# Patient Record
Sex: FEMALE | Race: ASIAN | NOT HISPANIC OR LATINO | URBAN - METROPOLITAN AREA
[De-identification: names, ages, dates, MRNs, and addresses within clinical notes are randomized per-mention and may not be internally consistent; named-entity substitution may affect disease eponyms.]

---

## 2017-08-25 PROBLEM — Z00.00 ENCOUNTER FOR PREVENTIVE HEALTH EXAMINATION: Status: ACTIVE | Noted: 2017-08-25

## 2017-09-12 ENCOUNTER — OUTPATIENT (OUTPATIENT)
Dept: OUTPATIENT SERVICES | Age: 32
LOS: 1 days | Discharge: HOME | End: 2017-09-12

## 2017-09-13 ENCOUNTER — APPOINTMENT (OUTPATIENT)
Dept: PEDIATRIC CARDIOLOGY | Facility: CLINIC | Age: 32
End: 2017-09-13
Payer: MEDICAID

## 2017-09-13 PROCEDURE — 76820 UMBILICAL ARTERY ECHO: CPT

## 2017-09-13 PROCEDURE — 93325 DOPPLER ECHO COLOR FLOW MAPG: CPT | Mod: 59

## 2017-09-13 PROCEDURE — 76827 ECHO EXAM OF FETAL HEART: CPT

## 2017-09-13 PROCEDURE — 99201 OFFICE OUTPATIENT NEW 10 MINUTES: CPT | Mod: 25

## 2017-09-13 PROCEDURE — 76825 ECHO EXAM OF FETAL HEART: CPT

## 2018-01-13 ENCOUNTER — INPATIENT (INPATIENT)
Facility: HOSPITAL | Age: 33
LOS: 2 days | Discharge: HOME | End: 2018-01-16
Attending: OBSTETRICS & GYNECOLOGY | Admitting: OBSTETRICS & GYNECOLOGY
Payer: MEDICAID

## 2018-01-13 VITALS — WEIGHT: 138.89 LBS | HEIGHT: 68 IN

## 2018-01-13 DIAGNOSIS — O26.899 OTHER SPECIFIED PREGNANCY RELATED CONDITIONS, UNSPECIFIED TRIMESTER: ICD-10-CM

## 2018-01-13 DIAGNOSIS — Z3A.00 WEEKS OF GESTATION OF PREGNANCY NOT SPECIFIED: ICD-10-CM

## 2018-01-13 DIAGNOSIS — Z34.80 ENCOUNTER FOR SUPERVISION OF OTHER NORMAL PREGNANCY, UNSPECIFIED TRIMESTER: ICD-10-CM

## 2018-01-13 LAB
BASOPHILS # BLD AUTO: 0 K/UL — SIGNIFICANT CHANGE UP (ref 0–0.2)
BASOPHILS NFR BLD AUTO: 0.3 % — SIGNIFICANT CHANGE UP (ref 0–2)
BLD GP AB SCN SERPL QL: NEGATIVE — SIGNIFICANT CHANGE UP
EOSINOPHIL # BLD AUTO: 0.1 K/UL — SIGNIFICANT CHANGE UP (ref 0–0.5)
EOSINOPHIL NFR BLD AUTO: 1 % — SIGNIFICANT CHANGE UP (ref 0–6)
HCT VFR BLD CALC: 33.7 % — LOW (ref 34.5–45)
HGB BLD-MCNC: 11.6 G/DL — SIGNIFICANT CHANGE UP (ref 11.5–15.5)
LYMPHOCYTES # BLD AUTO: 1.5 K/UL — SIGNIFICANT CHANGE UP (ref 1–3.3)
LYMPHOCYTES # BLD AUTO: 14.8 % — SIGNIFICANT CHANGE UP (ref 13–44)
MCHC RBC-ENTMCNC: 31.6 PG — SIGNIFICANT CHANGE UP (ref 27–34)
MCHC RBC-ENTMCNC: 34.4 GM/DL — SIGNIFICANT CHANGE UP (ref 32–36)
MCV RBC AUTO: 91.9 FL — SIGNIFICANT CHANGE UP (ref 80–100)
MONOCYTES # BLD AUTO: 0.8 K/UL — SIGNIFICANT CHANGE UP (ref 0–0.9)
MONOCYTES NFR BLD AUTO: 7.8 % — SIGNIFICANT CHANGE UP (ref 2–14)
NEUTROPHILS # BLD AUTO: 7.8 K/UL — HIGH (ref 1.8–7.4)
NEUTROPHILS NFR BLD AUTO: 76.1 % — SIGNIFICANT CHANGE UP (ref 43–77)
PLATELET # BLD AUTO: 162 K/UL — SIGNIFICANT CHANGE UP (ref 150–400)
RBC # BLD: 3.67 M/UL — LOW (ref 3.8–5.2)
RBC # FLD: 12.3 % — SIGNIFICANT CHANGE UP (ref 10.3–14.5)
RH IG SCN BLD-IMP: POSITIVE — SIGNIFICANT CHANGE UP
RH IG SCN BLD-IMP: POSITIVE — SIGNIFICANT CHANGE UP
WBC # BLD: 10.2 K/UL — SIGNIFICANT CHANGE UP (ref 3.8–10.5)
WBC # FLD AUTO: 10.2 K/UL — SIGNIFICANT CHANGE UP (ref 3.8–10.5)

## 2018-01-13 RX ORDER — AMPICILLIN TRIHYDRATE 250 MG
CAPSULE ORAL
Qty: 0 | Refills: 0 | Status: DISCONTINUED | OUTPATIENT
Start: 2018-01-13 | End: 2018-01-14

## 2018-01-13 RX ORDER — AMPICILLIN TRIHYDRATE 250 MG
1 CAPSULE ORAL EVERY 4 HOURS
Qty: 0 | Refills: 0 | Status: DISCONTINUED | OUTPATIENT
Start: 2018-01-13 | End: 2018-01-14

## 2018-01-13 RX ORDER — OXYTOCIN 10 UNIT/ML
4 VIAL (ML) INJECTION
Qty: 30 | Refills: 0 | Status: DISCONTINUED | OUTPATIENT
Start: 2018-01-13 | End: 2018-01-16

## 2018-01-13 RX ORDER — CITRIC ACID/SODIUM CITRATE 300-500 MG
15 SOLUTION, ORAL ORAL EVERY 4 HOURS
Qty: 0 | Refills: 0 | Status: DISCONTINUED | OUTPATIENT
Start: 2018-01-13 | End: 2018-01-14

## 2018-01-13 RX ORDER — AMPICILLIN TRIHYDRATE 250 MG
2 CAPSULE ORAL ONCE
Qty: 0 | Refills: 0 | Status: COMPLETED | OUTPATIENT
Start: 2018-01-13 | End: 2018-01-13

## 2018-01-13 RX ORDER — SODIUM CHLORIDE 9 MG/ML
1000 INJECTION, SOLUTION INTRAVENOUS ONCE
Qty: 0 | Refills: 0 | Status: COMPLETED | OUTPATIENT
Start: 2018-01-13 | End: 2018-01-13

## 2018-01-13 RX ORDER — SODIUM CHLORIDE 9 MG/ML
1000 INJECTION, SOLUTION INTRAVENOUS
Qty: 0 | Refills: 0 | Status: DISCONTINUED | OUTPATIENT
Start: 2018-01-13 | End: 2018-01-14

## 2018-01-13 RX ORDER — OXYTOCIN 10 UNIT/ML
333.33 VIAL (ML) INJECTION
Qty: 20 | Refills: 0 | Status: COMPLETED | OUTPATIENT
Start: 2018-01-13

## 2018-01-13 RX ADMIN — Medication 216 GRAM(S): at 19:47

## 2018-01-13 RX ADMIN — SODIUM CHLORIDE 125 MILLILITER(S): 9 INJECTION, SOLUTION INTRAVENOUS at 20:29

## 2018-01-13 RX ADMIN — Medication 208 GRAM(S): at 23:48

## 2018-01-13 RX ADMIN — Medication 4 MILLIUNIT(S)/MIN: at 23:43

## 2018-01-13 RX ADMIN — SODIUM CHLORIDE 2000 MILLILITER(S): 9 INJECTION, SOLUTION INTRAVENOUS at 19:30

## 2018-01-14 LAB
HBV SURFACE AG SERPL QL IA: SIGNIFICANT CHANGE UP
HIV 1 & 2 AB SERPL IA.RAPID: SIGNIFICANT CHANGE UP
RUBV IGG SER-ACNC: 2.2 INDEX — SIGNIFICANT CHANGE UP
RUBV IGG SER-IMP: POSITIVE — SIGNIFICANT CHANGE UP
T PALLIDUM AB TITR SER: NEGATIVE — SIGNIFICANT CHANGE UP

## 2018-01-14 RX ORDER — OXYCODONE HYDROCHLORIDE 5 MG/1
5 TABLET ORAL
Qty: 0 | Refills: 0 | Status: DISCONTINUED | OUTPATIENT
Start: 2018-01-14 | End: 2018-01-16

## 2018-01-14 RX ORDER — OXYCODONE HYDROCHLORIDE 5 MG/1
5 TABLET ORAL EVERY 4 HOURS
Qty: 0 | Refills: 0 | Status: DISCONTINUED | OUTPATIENT
Start: 2018-01-14 | End: 2018-01-16

## 2018-01-14 RX ORDER — ACETAMINOPHEN 500 MG
975 TABLET ORAL EVERY 6 HOURS
Qty: 0 | Refills: 0 | Status: COMPLETED | OUTPATIENT
Start: 2018-01-14 | End: 2018-12-13

## 2018-01-14 RX ORDER — OXYTOCIN 10 UNIT/ML
41.67 VIAL (ML) INJECTION
Qty: 20 | Refills: 0 | Status: DISCONTINUED | OUTPATIENT
Start: 2018-01-14 | End: 2018-01-16

## 2018-01-14 RX ORDER — SODIUM CHLORIDE 9 MG/ML
3 INJECTION INTRAMUSCULAR; INTRAVENOUS; SUBCUTANEOUS EVERY 8 HOURS
Qty: 0 | Refills: 0 | Status: DISCONTINUED | OUTPATIENT
Start: 2018-01-14 | End: 2018-01-16

## 2018-01-14 RX ORDER — OXYTOCIN 10 UNIT/ML
41.67 VIAL (ML) INJECTION
Qty: 20 | Refills: 0 | Status: DISCONTINUED | OUTPATIENT
Start: 2018-01-14 | End: 2018-01-14

## 2018-01-14 RX ORDER — HYDROCORTISONE 1 %
1 OINTMENT (GRAM) TOPICAL EVERY 4 HOURS
Qty: 0 | Refills: 0 | Status: DISCONTINUED | OUTPATIENT
Start: 2018-01-14 | End: 2018-01-16

## 2018-01-14 RX ORDER — IBUPROFEN 200 MG
600 TABLET ORAL EVERY 6 HOURS
Qty: 0 | Refills: 0 | Status: DISCONTINUED | OUTPATIENT
Start: 2018-01-14 | End: 2018-01-16

## 2018-01-14 RX ORDER — AER TRAVELER 0.5 G/1
1 SOLUTION RECTAL; TOPICAL EVERY 4 HOURS
Qty: 0 | Refills: 0 | Status: DISCONTINUED | OUTPATIENT
Start: 2018-01-14 | End: 2018-01-16

## 2018-01-14 RX ORDER — GLYCERIN ADULT
1 SUPPOSITORY, RECTAL RECTAL AT BEDTIME
Qty: 0 | Refills: 0 | Status: DISCONTINUED | OUTPATIENT
Start: 2018-01-14 | End: 2018-01-16

## 2018-01-14 RX ORDER — DIBUCAINE 1 %
1 OINTMENT (GRAM) RECTAL EVERY 4 HOURS
Qty: 0 | Refills: 0 | Status: DISCONTINUED | OUTPATIENT
Start: 2018-01-14 | End: 2018-01-14

## 2018-01-14 RX ORDER — DIPHENHYDRAMINE HCL 50 MG
25 CAPSULE ORAL EVERY 6 HOURS
Qty: 0 | Refills: 0 | Status: DISCONTINUED | OUTPATIENT
Start: 2018-01-14 | End: 2018-01-16

## 2018-01-14 RX ORDER — TETANUS TOXOID, REDUCED DIPHTHERIA TOXOID AND ACELLULAR PERTUSSIS VACCINE, ADSORBED 5; 2.5; 8; 8; 2.5 [IU]/.5ML; [IU]/.5ML; UG/.5ML; UG/.5ML; UG/.5ML
0.5 SUSPENSION INTRAMUSCULAR ONCE
Qty: 0 | Refills: 0 | Status: DISCONTINUED | OUTPATIENT
Start: 2018-01-14 | End: 2018-01-16

## 2018-01-14 RX ORDER — MAGNESIUM HYDROXIDE 400 MG/1
30 TABLET, CHEWABLE ORAL
Qty: 0 | Refills: 0 | Status: DISCONTINUED | OUTPATIENT
Start: 2018-01-14 | End: 2018-01-16

## 2018-01-14 RX ORDER — HYDROCORTISONE 1 %
1 OINTMENT (GRAM) TOPICAL EVERY 4 HOURS
Qty: 0 | Refills: 0 | Status: DISCONTINUED | OUTPATIENT
Start: 2018-01-14 | End: 2018-01-14

## 2018-01-14 RX ORDER — SODIUM CHLORIDE 9 MG/ML
3 INJECTION INTRAMUSCULAR; INTRAVENOUS; SUBCUTANEOUS EVERY 8 HOURS
Qty: 0 | Refills: 0 | Status: DISCONTINUED | OUTPATIENT
Start: 2018-01-14 | End: 2018-01-14

## 2018-01-14 RX ORDER — SIMETHICONE 80 MG/1
80 TABLET, CHEWABLE ORAL EVERY 6 HOURS
Qty: 0 | Refills: 0 | Status: DISCONTINUED | OUTPATIENT
Start: 2018-01-14 | End: 2018-01-16

## 2018-01-14 RX ORDER — IBUPROFEN 200 MG
600 TABLET ORAL EVERY 6 HOURS
Qty: 0 | Refills: 0 | Status: COMPLETED | OUTPATIENT
Start: 2018-01-14 | End: 2018-12-13

## 2018-01-14 RX ORDER — PRAMOXINE HYDROCHLORIDE 150 MG/15G
1 AEROSOL, FOAM RECTAL EVERY 4 HOURS
Qty: 0 | Refills: 0 | Status: DISCONTINUED | OUTPATIENT
Start: 2018-01-14 | End: 2018-01-14

## 2018-01-14 RX ORDER — AER TRAVELER 0.5 G/1
1 SOLUTION RECTAL; TOPICAL EVERY 4 HOURS
Qty: 0 | Refills: 0 | Status: DISCONTINUED | OUTPATIENT
Start: 2018-01-14 | End: 2018-01-14

## 2018-01-14 RX ORDER — ACETAMINOPHEN 500 MG
975 TABLET ORAL EVERY 6 HOURS
Qty: 0 | Refills: 0 | Status: DISCONTINUED | OUTPATIENT
Start: 2018-01-14 | End: 2018-01-16

## 2018-01-14 RX ORDER — DIBUCAINE 1 %
1 OINTMENT (GRAM) RECTAL EVERY 4 HOURS
Qty: 0 | Refills: 0 | Status: DISCONTINUED | OUTPATIENT
Start: 2018-01-14 | End: 2018-01-16

## 2018-01-14 RX ORDER — OXYTOCIN 10 UNIT/ML
333.33 VIAL (ML) INJECTION
Qty: 20 | Refills: 0 | Status: COMPLETED | OUTPATIENT
Start: 2018-01-14 | End: 2018-01-14

## 2018-01-14 RX ORDER — DOCUSATE SODIUM 100 MG
100 CAPSULE ORAL
Qty: 0 | Refills: 0 | Status: DISCONTINUED | OUTPATIENT
Start: 2018-01-14 | End: 2018-01-15

## 2018-01-14 RX ORDER — KETOROLAC TROMETHAMINE 30 MG/ML
30 SYRINGE (ML) INJECTION ONCE
Qty: 0 | Refills: 0 | Status: DISCONTINUED | OUTPATIENT
Start: 2018-01-14 | End: 2018-01-14

## 2018-01-14 RX ORDER — PRAMOXINE HYDROCHLORIDE 150 MG/15G
1 AEROSOL, FOAM RECTAL EVERY 4 HOURS
Qty: 0 | Refills: 0 | Status: DISCONTINUED | OUTPATIENT
Start: 2018-01-14 | End: 2018-01-16

## 2018-01-14 RX ORDER — LANOLIN
1 OINTMENT (GRAM) TOPICAL EVERY 6 HOURS
Qty: 0 | Refills: 0 | Status: DISCONTINUED | OUTPATIENT
Start: 2018-01-14 | End: 2018-01-16

## 2018-01-14 RX ADMIN — Medication 600 MILLIGRAM(S): at 16:50

## 2018-01-14 RX ADMIN — Medication 30 MILLIGRAM(S): at 04:44

## 2018-01-14 RX ADMIN — Medication 975 MILLIGRAM(S): at 20:09

## 2018-01-14 RX ADMIN — Medication 975 MILLIGRAM(S): at 19:39

## 2018-01-14 RX ADMIN — SODIUM CHLORIDE 3 MILLILITER(S): 9 INJECTION INTRAMUSCULAR; INTRAVENOUS; SUBCUTANEOUS at 16:36

## 2018-01-14 RX ADMIN — Medication 1000 MILLIUNIT(S)/MIN: at 05:12

## 2018-01-14 RX ADMIN — Medication 600 MILLIGRAM(S): at 16:08

## 2018-01-15 LAB
HBV SURFACE AG SERPL QL IA: SIGNIFICANT CHANGE UP
HCT VFR BLD CALC: 25.2 % — LOW (ref 34.5–45)
HGB BLD-MCNC: 8.4 G/DL — LOW (ref 11.5–15.5)
RUBV IGG SER-ACNC: 2.5 INDEX — SIGNIFICANT CHANGE UP
RUBV IGG SER-IMP: POSITIVE — SIGNIFICANT CHANGE UP

## 2018-01-15 RX ORDER — ASCORBIC ACID 60 MG
250 TABLET,CHEWABLE ORAL THREE TIMES A DAY
Qty: 0 | Refills: 0 | Status: DISCONTINUED | OUTPATIENT
Start: 2018-01-15 | End: 2018-01-16

## 2018-01-15 RX ORDER — FERROUS SULFATE 325(65) MG
325 TABLET ORAL
Qty: 0 | Refills: 0 | Status: DISCONTINUED | OUTPATIENT
Start: 2018-01-15 | End: 2018-01-16

## 2018-01-15 RX ORDER — DOCUSATE SODIUM 100 MG
100 CAPSULE ORAL THREE TIMES A DAY
Qty: 0 | Refills: 0 | Status: DISCONTINUED | OUTPATIENT
Start: 2018-01-15 | End: 2018-01-16

## 2018-01-15 RX ADMIN — Medication 325 MILLIGRAM(S): at 16:40

## 2018-01-15 RX ADMIN — Medication 250 MILLIGRAM(S): at 21:14

## 2018-01-15 RX ADMIN — Medication 1 TABLET(S): at 16:40

## 2018-01-15 RX ADMIN — Medication 975 MILLIGRAM(S): at 17:10

## 2018-01-15 RX ADMIN — Medication 100 MILLIGRAM(S): at 21:14

## 2018-01-15 RX ADMIN — Medication 100 MILLIGRAM(S): at 16:40

## 2018-01-15 RX ADMIN — Medication 250 MILLIGRAM(S): at 16:41

## 2018-01-15 RX ADMIN — Medication 975 MILLIGRAM(S): at 09:25

## 2018-01-15 RX ADMIN — Medication 325 MILLIGRAM(S): at 21:14

## 2018-01-15 RX ADMIN — Medication 975 MILLIGRAM(S): at 16:41

## 2018-01-15 NOTE — CHART NOTE - NSCHARTNOTEFT_GEN_A_CORE
PA NOTE     Day 1          Vital Signs Last 24 Hrs  T(C): 36.7 (15 Magan 2018 09:00), Max: 36.8 (2018 13:17)  T(F): 98.1 (15 Magan 2018 09:00), Max: 98.2 (2018 13:17)  HR: 93 (15 Magan 2018 09:00) (63 - 93)  BP: 103/67 (15 Magan 2018 09:00) (92/57 - 103/67)  BP(mean): --  RR: 16 (15 Magan 2018 09:00) (16 - 18)  SpO2: --               8.4    x     )-----------( x        ( 01-15 @ 08:46 )             25.2                11.6   10.2  )-----------( 162      ( 13 @ 20:09 )             33.7         Plan:  - Ferrous Sulfate, Colace, Vitamin C supplementation.  - Monitor for signs/symptoms of anemia.

## 2018-01-15 NOTE — PROGRESS NOTE ADULT - SUBJECTIVE AND OBJECTIVE BOX
Postpartum Note- PPD#1    Prenatal Labs  Blood type: AB Positive  Rubella IgG: Positive (01-14 @ 02:37)    RPR: Negative        S:Patient w/o complaints, pain is controlled.    Pt is OOB, tolerating PO, voiding. Lochia WNL.     O:  Vital Signs Last 24 Hrs  T(C): 36.6 (15 Magan 2018 06:03), Max: 36.8 (14 Jan 2018 13:17)  T(F): 97.8 (15 Magan 2018 06:03), Max: 98.2 (14 Jan 2018 13:17)  HR: 63 (15 Magan 2018 06:03) (61 - 86)  BP: 96/57 (15 Magan 2018 06:03) (92/57 - 99/62)  BP(mean): --  RR: 18 (15 Magan 2018 06:03) (18 - 18)  SpO2: 97% (14 Jan 2018 08:10) (97% - 97%)     Gen: NAD  Abdomen: Soft, nontender, non-distended, fundus firm.  Vaginal: Lochia WNL,    Ext:  Neg calf tenderness    LABS:    Hemoglobin: 11.6 g/dL (01-13 @ 20:09)      Hematocrit: 33.7 % (01-13 @ 20:09)

## 2018-01-16 ENCOUNTER — TRANSCRIPTION ENCOUNTER (OUTPATIENT)
Age: 33
End: 2018-01-16

## 2018-01-16 VITALS
DIASTOLIC BLOOD PRESSURE: 60 MMHG | SYSTOLIC BLOOD PRESSURE: 96 MMHG | RESPIRATION RATE: 18 BRPM | TEMPERATURE: 98 F | HEART RATE: 82 BPM

## 2018-01-16 PROCEDURE — 87340 HEPATITIS B SURFACE AG IA: CPT

## 2018-01-16 PROCEDURE — 85027 COMPLETE CBC AUTOMATED: CPT

## 2018-01-16 PROCEDURE — 86850 RBC ANTIBODY SCREEN: CPT

## 2018-01-16 PROCEDURE — G0463: CPT

## 2018-01-16 PROCEDURE — 86901 BLOOD TYPING SEROLOGIC RH(D): CPT

## 2018-01-16 PROCEDURE — 85018 HEMOGLOBIN: CPT

## 2018-01-16 PROCEDURE — 59050 FETAL MONITOR W/REPORT: CPT

## 2018-01-16 PROCEDURE — 86703 HIV-1/HIV-2 1 RESULT ANTBDY: CPT

## 2018-01-16 PROCEDURE — 86762 RUBELLA ANTIBODY: CPT

## 2018-01-16 PROCEDURE — 86900 BLOOD TYPING SEROLOGIC ABO: CPT

## 2018-01-16 PROCEDURE — 86780 TREPONEMA PALLIDUM: CPT

## 2018-01-16 PROCEDURE — 59025 FETAL NON-STRESS TEST: CPT

## 2018-01-16 RX ORDER — ACETAMINOPHEN 500 MG
3 TABLET ORAL
Qty: 0 | Refills: 0 | DISCHARGE
Start: 2018-01-16

## 2018-01-16 RX ORDER — IBUPROFEN 200 MG
1 TABLET ORAL
Qty: 0 | Refills: 0 | DISCHARGE
Start: 2018-01-16

## 2018-01-16 RX ADMIN — Medication 600 MILLIGRAM(S): at 06:00

## 2018-01-16 RX ADMIN — Medication 100 MILLIGRAM(S): at 05:11

## 2018-01-16 RX ADMIN — Medication 325 MILLIGRAM(S): at 08:29

## 2018-01-16 RX ADMIN — Medication 600 MILLIGRAM(S): at 05:11

## 2018-01-16 RX ADMIN — Medication 250 MILLIGRAM(S): at 05:11

## 2018-01-16 NOTE — PROGRESS NOTE ADULT - ATTENDING COMMENTS
pt seen and eval by me   doing well   dc home
I have examined this patient and I agree with the clinical plan

## 2018-01-16 NOTE — DISCHARGE NOTE OB - PATIENT PORTAL LINK FT
“You can access the Follow[employer] Patient Portal, offered by [facility], by registering with the following website: [email or web address]”

## 2018-01-16 NOTE — DISCHARGE NOTE OB - MATERIALS PROVIDED
Guide to Postpartum Care/[facility] Hearing Screen Program/Discharge Medication Information for Patients and Families Pocket Guide/Bottle Feeding Log/Birth Certificate Instructions

## 2018-01-16 NOTE — DISCHARGE NOTE OB - CARE PLAN
Principal Discharge DX:	Vaginal delivery  Goal:	return to prenatal state  Assessment and plan of treatment:	pelvic rest, limit activity, regular diet

## 2019-01-08 NOTE — DISCHARGE NOTE OB - CARE PROVIDER_API CALL
Patient to ED with , reports she had major surgery, Hipec, at [facility] 12/18/2018.     +10/10 RL back pain and vomiting today. Denies fevers.
Kalpana Campbell), Obstetrics  Gynecology  [address]  [address]  [place] [address]  Phone: [phone]  Fax: [phone]

## 2020-07-16 ENCOUNTER — TRANSCRIPTION ENCOUNTER (OUTPATIENT)
Age: 35
End: 2020-07-16

## 2020-07-16 ENCOUNTER — APPOINTMENT (OUTPATIENT)
Dept: DISASTER EMERGENCY | Facility: CLINIC | Age: 35
End: 2020-07-16

## 2020-07-16 VITALS
RESPIRATION RATE: 16 BRPM | WEIGHT: 121.03 LBS | HEART RATE: 65 BPM | DIASTOLIC BLOOD PRESSURE: 68 MMHG | TEMPERATURE: 98 F | SYSTOLIC BLOOD PRESSURE: 100 MMHG | OXYGEN SATURATION: 100 % | HEIGHT: 67 IN

## 2020-07-16 DIAGNOSIS — Z01.818 ENCOUNTER FOR OTHER PREPROCEDURAL EXAMINATION: ICD-10-CM

## 2020-07-16 RX ORDER — CEFAZOLIN SODIUM 1 G
2000 VIAL (EA) INJECTION ONCE
Refills: 0 | Status: DISCONTINUED | OUTPATIENT
Start: 2020-07-17 | End: 2020-08-01

## 2020-07-16 RX ORDER — SODIUM CHLORIDE 9 MG/ML
3 INJECTION INTRAMUSCULAR; INTRAVENOUS; SUBCUTANEOUS EVERY 8 HOURS
Refills: 0 | Status: DISCONTINUED | OUTPATIENT
Start: 2020-07-17 | End: 2020-08-01

## 2020-07-16 RX ORDER — LIDOCAINE HCL 20 MG/ML
0.2 VIAL (ML) INJECTION ONCE
Refills: 0 | Status: DISCONTINUED | OUTPATIENT
Start: 2020-07-17 | End: 2020-08-01

## 2020-07-16 NOTE — H&P PST ADULT - NSICDXPROBLEM_GEN_ALL_CORE_FT
PROBLEM DIAGNOSES  Problem: Encounter for elective termination of pregnancy  Assessment and Plan: Dilation Curettage for IUP

## 2020-07-16 NOTE — H&P PST ADULT - HISTORY OF PRESENT ILLNESS
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

## 2020-07-16 NOTE — H&P PST ADULT - NSICDXPASTSURGICALHX_GEN_ALL_CORE_FT
No significant past surgical history PAST SURGICAL HISTORY:  H/O breast augmentation 2000    One previous induced termination of pregnancy 4/2019

## 2020-07-17 ENCOUNTER — OUTPATIENT (OUTPATIENT)
Dept: OUTPATIENT SERVICES | Facility: HOSPITAL | Age: 35
LOS: 1 days | End: 2020-07-17
Payer: MEDICAID

## 2020-07-17 ENCOUNTER — RESULT REVIEW (OUTPATIENT)
Age: 35
End: 2020-07-17

## 2020-07-17 VITALS
SYSTOLIC BLOOD PRESSURE: 110 MMHG | RESPIRATION RATE: 14 BRPM | OXYGEN SATURATION: 99 % | DIASTOLIC BLOOD PRESSURE: 65 MMHG | HEART RATE: 70 BPM

## 2020-07-17 DIAGNOSIS — Z98.890 OTHER SPECIFIED POSTPROCEDURAL STATES: Chronic | ICD-10-CM

## 2020-07-17 DIAGNOSIS — Z33.2 ENCOUNTER FOR ELECTIVE TERMINATION OF PREGNANCY: ICD-10-CM

## 2020-07-17 DIAGNOSIS — Z98.82 BREAST IMPLANT STATUS: Chronic | ICD-10-CM

## 2020-07-17 LAB
BLD GP AB SCN SERPL QL: NEGATIVE — SIGNIFICANT CHANGE UP
HCT VFR BLD CALC: 39.7 % — SIGNIFICANT CHANGE UP (ref 34.5–45)
HGB BLD-MCNC: 13 G/DL — SIGNIFICANT CHANGE UP (ref 11.5–15.5)
MCHC RBC-ENTMCNC: 31 PG — SIGNIFICANT CHANGE UP (ref 27–34)
MCHC RBC-ENTMCNC: 32.7 GM/DL — SIGNIFICANT CHANGE UP (ref 32–36)
MCV RBC AUTO: 94.5 FL — SIGNIFICANT CHANGE UP (ref 80–100)
NRBC # BLD: 0 /100 WBCS — SIGNIFICANT CHANGE UP (ref 0–0)
PLATELET # BLD AUTO: 164 K/UL — SIGNIFICANT CHANGE UP (ref 150–400)
RBC # BLD: 4.2 M/UL — SIGNIFICANT CHANGE UP (ref 3.8–5.2)
RBC # FLD: 12.5 % — SIGNIFICANT CHANGE UP (ref 10.3–14.5)
RH IG SCN BLD-IMP: POSITIVE — SIGNIFICANT CHANGE UP
WBC # BLD: 8.2 K/UL — SIGNIFICANT CHANGE UP (ref 3.8–10.5)
WBC # FLD AUTO: 8.2 K/UL — SIGNIFICANT CHANGE UP (ref 3.8–10.5)

## 2020-07-17 PROCEDURE — 85027 COMPLETE CBC AUTOMATED: CPT

## 2020-07-17 PROCEDURE — 86850 RBC ANTIBODY SCREEN: CPT

## 2020-07-17 PROCEDURE — 59841 INDUCED ABORTION DILAT&EVAC: CPT

## 2020-07-17 PROCEDURE — 86901 BLOOD TYPING SEROLOGIC RH(D): CPT

## 2020-07-17 PROCEDURE — 86900 BLOOD TYPING SEROLOGIC ABO: CPT

## 2020-07-17 PROCEDURE — 88305 TISSUE EXAM BY PATHOLOGIST: CPT

## 2020-07-17 PROCEDURE — 88305 TISSUE EXAM BY PATHOLOGIST: CPT | Mod: 26

## 2020-07-17 RX ORDER — ONDANSETRON 8 MG/1
1 TABLET, FILM COATED ORAL
Qty: 0 | Refills: 0 | DISCHARGE

## 2020-07-17 RX ORDER — DOXYLAMINE SUCCINATE 25 MG
1 TABLET ORAL
Qty: 0 | Refills: 0 | DISCHARGE

## 2020-07-17 NOTE — ASU PATIENT PROFILE, ADULT - VISION (WITH CORRECTIVE LENSES IF THE PATIENT USUALLY WEARS THEM):
Telephone Encounter by Brenda Coffman Normal vision: sees adequately in most situations; can see medication labels, newsprint

## 2020-07-17 NOTE — BRIEF OPERATIVE NOTE - NSICDXBRIEFPROCEDURE_GEN_ALL_CORE_FT
PROCEDURES:  D&C (dilatation and curettage) for termination of pregnancy 17-Jul-2020 14:42:24  Alicia Meza

## 2020-07-17 NOTE — ASU DISCHARGE PLAN (ADULT/PEDIATRIC) - CARE PROVIDER_API CALL
Alicia Meza  OBSTETRICS AND GYNECOLOGY  [address] [address]  Phone: [phone]  Fax: [phone]  Follow Up Time:

## 2020-07-17 NOTE — ASU DISCHARGE PLAN (ADULT/PEDIATRIC) - NURSING INSTRUCTIONS
******************************************************************************************   Next dose of TYLENOL may be taken at or after 8:10 PM if needed. DO NOT take any products containing TYLENOL or ACETAMINOPHEN, such as VICODIN, PERCOCET, NORCO, EXCEDRIN, and any over-the-counter cold medications. DO NOT CONSUME MORE THAN 1526-3823 MG OF TYLENOL (acetaminophen) in a 24-hour period.   ******************************************************************************************   Next dose of NSAIDs (ibuprofen, motrin, advil, naproxen, aleve, or aspirin) may be taken at or after 8:30 PM if needed.   ******************************************************************************************

## 2020-12-31 NOTE — PATIENT PROFILE OB - INFLUENZA IMMUNIZATION DATE:
ABBY HOSPITALIST  History and Physical     Shan Bradley Patient Status:  Inpatient    1964 MRN [id number]   [facility] 7NE-A Attending Fred Hernandez MD   Hosp Day # 1 PCP Manuel Carrizales MD     Chief Complaint: headache     His Grandmother    • Cancer Paternal Grandmother         colon cancer   • Breast Cancer Paternal Grandmother         Allergies:   Gluten Flour            OTHER (SEE COMMENTS)    Comment:Celiac disease  Gluten Meal             OTHER (SEE COMMENTS)    Comment:Rivka positives and negatives noted in the HPI.     Physical Exam:    /80 (BP Location: Right arm)   Pulse 76   Temp 97.8 °F (36.6 °C) (Oral)   Resp 20   Ht 5' 2\" (1.575 m)   Wt 129 lb 13.6 oz (58.9 kg)   LMP 07/05/2011   SpO2 99%   BMI 23.75 kg/m²   Gener 5. goiter    Quality:  · DVT Prophylaxis: SCD  · CODE status: full  · Loya: none  · If COVID testing is negative, may discontinue isolation: yes     Plan of care discussed with pt and RN.     Krista Pritchett MD  12/31/2020 third trimester

## 2022-11-30 ENCOUNTER — INPATIENT (INPATIENT)
Facility: HOSPITAL | Age: 37
LOS: 2 days | Discharge: HOME | End: 2022-12-03
Attending: OBSTETRICS & GYNECOLOGY | Admitting: OBSTETRICS & GYNECOLOGY
Payer: MEDICAID

## 2022-11-30 VITALS — OXYGEN SATURATION: 94 % | HEART RATE: 86 BPM

## 2022-11-30 DIAGNOSIS — Z3A.00 WEEKS OF GESTATION OF PREGNANCY NOT SPECIFIED: ICD-10-CM

## 2022-11-30 DIAGNOSIS — O60.03 PRETERM LABOR WITHOUT DELIVERY, THIRD TRIMESTER: ICD-10-CM

## 2022-11-30 DIAGNOSIS — O26.899 OTHER SPECIFIED PREGNANCY RELATED CONDITIONS, UNSPECIFIED TRIMESTER: ICD-10-CM

## 2022-11-30 DIAGNOSIS — Z98.82 BREAST IMPLANT STATUS: Chronic | ICD-10-CM

## 2022-11-30 DIAGNOSIS — Z34.80 ENCOUNTER FOR SUPERVISION OF OTHER NORMAL PREGNANCY, UNSPECIFIED TRIMESTER: ICD-10-CM

## 2022-11-30 DIAGNOSIS — Z98.890 OTHER SPECIFIED POSTPROCEDURAL STATES: Chronic | ICD-10-CM

## 2022-11-30 PROBLEM — Z78.9 OTHER SPECIFIED HEALTH STATUS: Chronic | Status: ACTIVE | Noted: 2020-07-16

## 2022-11-30 LAB
APPEARANCE UR: CLEAR — SIGNIFICANT CHANGE UP
BASOPHILS # BLD AUTO: 0.04 K/UL — SIGNIFICANT CHANGE UP (ref 0–0.2)
BASOPHILS NFR BLD AUTO: 0.4 % — SIGNIFICANT CHANGE UP (ref 0–2)
BILIRUB UR-MCNC: NEGATIVE — SIGNIFICANT CHANGE UP
BLD GP AB SCN SERPL QL: NEGATIVE — SIGNIFICANT CHANGE UP
COLOR SPEC: SIGNIFICANT CHANGE UP
COVID-19 SPIKE DOMAIN AB INTERP: POSITIVE
COVID-19 SPIKE DOMAIN ANTIBODY RESULT: >250 U/ML — HIGH
DIFF PNL FLD: NEGATIVE — SIGNIFICANT CHANGE UP
EOSINOPHIL # BLD AUTO: 0.13 K/UL — SIGNIFICANT CHANGE UP (ref 0–0.5)
EOSINOPHIL NFR BLD AUTO: 1.5 % — SIGNIFICANT CHANGE UP (ref 0–6)
GLUCOSE BLDC GLUCOMTR-MCNC: 83 MG/DL — SIGNIFICANT CHANGE UP (ref 70–99)
GLUCOSE BLDC GLUCOMTR-MCNC: 90 MG/DL — SIGNIFICANT CHANGE UP (ref 70–99)
GLUCOSE BLDC GLUCOMTR-MCNC: 92 MG/DL — SIGNIFICANT CHANGE UP (ref 70–99)
GLUCOSE UR QL: NEGATIVE — SIGNIFICANT CHANGE UP
HCT VFR BLD CALC: 34.4 % — LOW (ref 34.5–45)
HGB BLD-MCNC: 11.1 G/DL — LOW (ref 11.5–15.5)
IMM GRANULOCYTES NFR BLD AUTO: 1.8 % — HIGH (ref 0–0.9)
KETONES UR-MCNC: NEGATIVE — SIGNIFICANT CHANGE UP
LEUKOCYTE ESTERASE UR-ACNC: ABNORMAL
LYMPHOCYTES # BLD AUTO: 1.29 K/UL — SIGNIFICANT CHANGE UP (ref 1–3.3)
LYMPHOCYTES # BLD AUTO: 14.5 % — SIGNIFICANT CHANGE UP (ref 13–44)
MCHC RBC-ENTMCNC: 31 PG — SIGNIFICANT CHANGE UP (ref 27–34)
MCHC RBC-ENTMCNC: 32.3 GM/DL — SIGNIFICANT CHANGE UP (ref 32–36)
MCV RBC AUTO: 96.1 FL — SIGNIFICANT CHANGE UP (ref 80–100)
MONOCYTES # BLD AUTO: 0.79 K/UL — SIGNIFICANT CHANGE UP (ref 0–0.9)
MONOCYTES NFR BLD AUTO: 8.9 % — SIGNIFICANT CHANGE UP (ref 2–14)
NEUTROPHILS # BLD AUTO: 6.49 K/UL — SIGNIFICANT CHANGE UP (ref 1.8–7.4)
NEUTROPHILS NFR BLD AUTO: 72.9 % — SIGNIFICANT CHANGE UP (ref 43–77)
NITRITE UR-MCNC: NEGATIVE — SIGNIFICANT CHANGE UP
NRBC # BLD: 0 /100 WBCS — SIGNIFICANT CHANGE UP (ref 0–0)
PH UR: 6.5 — SIGNIFICANT CHANGE UP (ref 5–8)
PLATELET # BLD AUTO: 158 K/UL — SIGNIFICANT CHANGE UP (ref 150–400)
PROT UR-MCNC: NEGATIVE — SIGNIFICANT CHANGE UP
RBC # BLD: 3.58 M/UL — LOW (ref 3.8–5.2)
RBC # FLD: 14.7 % — HIGH (ref 10.3–14.5)
RH IG SCN BLD-IMP: POSITIVE — SIGNIFICANT CHANGE UP
SARS-COV-2 IGG+IGM SERPL QL IA: >250 U/ML — HIGH
SARS-COV-2 IGG+IGM SERPL QL IA: POSITIVE
SARS-COV-2 RNA SPEC QL NAA+PROBE: SIGNIFICANT CHANGE UP
SP GR SPEC: 1.01 — SIGNIFICANT CHANGE UP (ref 1.01–1.02)
T PALLIDUM AB TITR SER: NEGATIVE — SIGNIFICANT CHANGE UP
UROBILINOGEN FLD QL: NEGATIVE — SIGNIFICANT CHANGE UP
WBC # BLD: 8.9 K/UL — SIGNIFICANT CHANGE UP (ref 3.8–10.5)
WBC # FLD AUTO: 8.9 K/UL — SIGNIFICANT CHANGE UP (ref 3.8–10.5)

## 2022-11-30 RX ORDER — AMPICILLIN TRIHYDRATE 250 MG
2 CAPSULE ORAL ONCE
Refills: 0 | Status: COMPLETED | OUTPATIENT
Start: 2022-11-30 | End: 2022-11-30

## 2022-11-30 RX ORDER — SODIUM CHLORIDE 9 MG/ML
1000 INJECTION, SOLUTION INTRAVENOUS
Refills: 0 | Status: DISCONTINUED | OUTPATIENT
Start: 2022-11-30 | End: 2022-12-01

## 2022-11-30 RX ORDER — CITRIC ACID/SODIUM CITRATE 300-500 MG
15 SOLUTION, ORAL ORAL EVERY 6 HOURS
Refills: 0 | Status: DISCONTINUED | OUTPATIENT
Start: 2022-11-30 | End: 2022-12-01

## 2022-11-30 RX ORDER — OXYTOCIN 10 UNIT/ML
333.33 VIAL (ML) INJECTION
Qty: 20 | Refills: 0 | Status: COMPLETED | OUTPATIENT
Start: 2022-11-30 | End: 2022-11-30

## 2022-11-30 RX ORDER — SODIUM CHLORIDE 9 MG/ML
1000 INJECTION, SOLUTION INTRAVENOUS
Refills: 0 | Status: DISCONTINUED | OUTPATIENT
Start: 2022-11-30 | End: 2022-11-30

## 2022-11-30 RX ORDER — SODIUM CHLORIDE 9 MG/ML
1000 INJECTION INTRAMUSCULAR; INTRAVENOUS; SUBCUTANEOUS ONCE
Refills: 0 | Status: COMPLETED | OUTPATIENT
Start: 2022-11-30 | End: 2022-11-30

## 2022-11-30 RX ORDER — SODIUM CHLORIDE 9 MG/ML
1000 INJECTION, SOLUTION INTRAVENOUS ONCE
Refills: 0 | Status: DISCONTINUED | OUTPATIENT
Start: 2022-11-30 | End: 2022-11-30

## 2022-11-30 RX ORDER — CHLORHEXIDINE GLUCONATE 213 G/1000ML
1 SOLUTION TOPICAL ONCE
Refills: 0 | Status: DISCONTINUED | OUTPATIENT
Start: 2022-11-30 | End: 2022-12-01

## 2022-11-30 RX ORDER — AMPICILLIN TRIHYDRATE 250 MG
1 CAPSULE ORAL EVERY 4 HOURS
Refills: 0 | Status: DISCONTINUED | OUTPATIENT
Start: 2022-11-30 | End: 2022-12-01

## 2022-11-30 RX ORDER — SODIUM CHLORIDE 9 MG/ML
1000 INJECTION INTRAMUSCULAR; INTRAVENOUS; SUBCUTANEOUS
Refills: 0 | Status: DISCONTINUED | OUTPATIENT
Start: 2022-11-30 | End: 2022-12-03

## 2022-11-30 RX ADMIN — SODIUM CHLORIDE 1000 MILLILITER(S): 9 INJECTION INTRAMUSCULAR; INTRAVENOUS; SUBCUTANEOUS at 10:28

## 2022-11-30 RX ADMIN — Medication 108 GRAM(S): at 16:39

## 2022-11-30 RX ADMIN — Medication 108 GRAM(S): at 20:44

## 2022-11-30 RX ADMIN — Medication 200 GRAM(S): at 12:38

## 2022-11-30 RX ADMIN — SODIUM CHLORIDE 125 MILLILITER(S): 9 INJECTION, SOLUTION INTRAVENOUS at 22:40

## 2022-11-30 NOTE — OB PROVIDER H&P - PROBLEM SELECTOR PLAN 1
-Admit to L and D  -Routine labs  -NPO  -Finger sticks q4 hours in latent labor; q2 hours in active labor  -Rotating fluids as per GDM protocol  -Bictra  -EFM/toco  -Anesthesia consult  -AMP for GBS unknown

## 2022-11-30 NOTE — OB PROVIDER TRIAGE NOTE - NSOBPROVIDERNOTE_OBGYN_ALL_OB_FT
37 year old  at 34.4 weeks contractions q3min r/o PTL    -UA/UCx  -NST/toco  -IVH  -repeat VE in 2 hours    d/w MD Shannan Nice P-BC

## 2022-11-30 NOTE — OB PROVIDER H&P - ASSESSMENT
37 year old  at 34.4 weeks in  Labor, cervical change from 1-2.5cm, regular painful contractions q3min; A2; GBS unknown    -Admit to L and D  -Routine labs  -NPO  -Finger sticks q4 hours in latent labor; q2 hours in active labor  -Rotating fluids as per GDM protocol  -Bictra  -EFM/toco  -Anesthesia consult  -AMP for GBS unknown    d/w MD Shannan Nice [facility]-BC

## 2022-11-30 NOTE — OB PROVIDER H&P - LABOR: CERVICAL DILATION
with recurrent CHF last few month, end up in the hospital for, was identified worsening his aortic stenosis, which was underestimated due to low EF 30-35%.    The patient having exertional SOB, improve somehwat after maximum treatment and then relapse again and again.     Advised based on his moderate clinical risk and also his moderate LV dysfunction and recurrent CHF to have TAVR. Underwent Transcatheter aortic valve replacement using a 34 mm CoreValve EVOLUT via right Femoral approach. Serial # [id number]    Did very well post operatively and had temporary pacer taken out on same day around 4 PM. He complained of right leg pain, which improved tremendously prior to discharge. His echo prior to discharge showed:  Left ventricle is mildly enlarged, basal septal hypertrophy, global left  ventricular systolic function is moderately to severely reduced, calculated  ejection fraction is 36%. Grade I (mild) left ventricular diastolic dysfunction. Left atrium is moderately dilated. Normal right ventricular size and function. Bioprosthetic valve is seated in the aortic position, appears to be  functioning normally with no obvious regrugitation or stenosis. Trivial perivalvular leak noted. Peak instantaneous gradient 17 mmHg and mean gradient 9 mmHg. Mitral valve sclerosis without stenosis. Mild mitral regurgitation. Tricuspid valve was not well visualized. Trivial tricuspid regurgitation. Medications changes recommendation: As per discharge list   Follow Up Plan: 4 weeks as outpatient       Discharge exam:   Vitals:    01/23/20 0620   BP: (!) 131/57   Pulse: 53   Resp: 18   Temp: 97.8 °F (36.6 °C)   SpO2:        Patient is feeling much better, denies any chest pain, dyspnea, orthopnea or palpitations. Neuro: normal  Chest: Clear to ausculation. No wheezing.    Cardiac: Cor RRR  Abdomen/groin: soft, non-tender, without masses or organomegaly  Lower extremity edema: none     Follow up with primary care provider 1 week  Follow up with cardiology 4 weeks  Follow up with other consultant physicians at their directions. Discharge Medications:   Bean Current   Home Medication Instructions LLN:[id number]    Printed on:01/23/20 1052   Medication Information                      amLODIPine (NORVASC) 10 MG tablet  Take 1 tablet by mouth daily             aspirin 81 MG EC tablet  Take 81 mg by mouth daily. clopidogrel (PLAVIX) 75 MG tablet  Take 1 tablet by mouth daily Take 1 daily             furosemide (LASIX) 40 MG tablet  Take 0.5 tablets by mouth daily             glipiZIDE (GLUCOTROL) 10 MG tablet  Take 10 mg by mouth 2 times daily (before meals)             lisinopril (PRINIVIL;ZESTRIL) 20 MG tablet  Take 1 tablet by mouth daily             metFORMIN (GLUCOPHAGE) 1000 MG tablet  Take 1 tablet by mouth 2 times daily (with meals)             pioglitazone (ACTOS) 30 MG tablet  Take 30 mg by mouth daily             simvastatin (ZOCOR) 10 MG tablet  Take 10 mg by mouth nightly             vitamin D (CHOLECALCIFEROL) 1000 UNIT TABS tablet  Take 1,000 Units by mouth 2 times daily                     Discussed with patient and nursing. Medications and discharge instructions reviewed with patient and nursing. Patient counseled in detail regarding medication compliance and risk factor modification. Electronically signed by Alba Camara MD on 1/23/2020 at 10:56 AM  Fellow, [address] [place] [address]     Attending Physician Statement  I have discussed the case of Theron Jesus including pertinent history and exam findings with the resident. I have seen and examined the patient and the key elements of the encounter have been performed by me. I agree with the assessment, plan and orders as documented by the resident With changes made to the note.      Electronically signed by Nolan Meza MD on 1/24/2020 at 6:46 PM.    [facility] 2-3.9 cm

## 2022-11-30 NOTE — OB PROVIDER H&P - NS_ADMITDT_OBGYN_ALL_OB_DT
Quality 431: Preventive Care And Screening: Unhealthy Alcohol Use - Screening: Patient identified as an unhealthy alcohol user when screened for unhealthy alcohol use using a systematic screening method and received brief counseling Detail Level: Detailed Quality 226: Preventive Care And Screening: Tobacco Use: Screening And Cessation Intervention: Patient screened for tobacco use and is an ex/non-smoker Quality 402: Tobacco Use And Help With Quitting Among Adolescents: Patient screened for tobacco and never smoked Quality 130: Documentation Of Current Medications In The Medical Record: Current Medications Documented 30-Nov-2022 11:49

## 2022-11-30 NOTE — OB PROVIDER H&P - HISTORY OF PRESENT ILLNESS
37 year old  at 34.4 weeks presents with contractions q5 mins since last night. Reports irregular contractions over the past few weeks but now regular and 7/10; denies need for pain medication at this time. Denies leaking of fluid, vaginal bleeding. Endorses + FM. Denies nausea, vomiting, changes in eating or bowel patterns; denies urinary burning or frequency but reports suprapubic pain. Pregnancy complicated by GDMA2. GBS unknown.    ObHx: FT  2018 7lbs; eTOP 10 wks conjoined twins s/p d&c  MedHx: denies  SurgHx: d&c   GynHx: denies fibroids, cysts, abnormal paps, STIs  SocialHx: denies  ETOH, tobacco, druguse  PsychHx: denies anxiety, depression PPD  All: NKDA, NKFA, NKEA  Meds: NPH, PNV    Vital Signs Last 24 Hrs  T(C): 37 (2022 07:57), Max: 37.0 (2022 07:49)  T(F): 98.6 (2022 07:57), Max: 98.6 (2022 07:49)  HR: 67 (2022 08:49) (62 - 74)  BP: 98/55 (2022 07:57) (98/55 - 98/55)  BP(mean): --  RR: 18 (2022 07:57) (18 - 18)  SpO2: 100% (2022 08:49) (91% - 100%)    PE: NAD, AOx3, abdomen soft gravid  VE: 1.0/0/-3  EFM: 130, moderate variability, +accels, -decels  Sinclair: q2-3min

## 2022-11-30 NOTE — OB PROVIDER TRIAGE NOTE - HISTORY OF PRESENT ILLNESS
37 year old  at 34.4 weeks presents with contractions q5 mins since last night. Reports irregular contractions over the past few weeks but now regular and 7/10; denies need for pain medication at this time. Denies leaking of fluid, vaginal bleeding. Endorses + FM. Denies nausea, vomiting, changes in eating or bowel patterns; denies urinary burning or frequency but reports suprapubic pain. Pregnancy complicated by GDMA2. GBS unknown.    ObHx: FT  2018 7lbs; eTOP 10 wks conjoined twins s/p d&c  MedHx: denies  SurgHx: d&c   GynHx: denies fibroids, cysts, abnormal paps, STIs  SocialHx: denies  ETOH, tobacco, druguse  PsychHx: denies anxiety, depression PPD  All: NKDA, NKFA, NKEA  Meds: NPH, PNV    Vital Signs Last 24 Hrs  T(C): 37 (2022 07:57), Max: 37.0 (2022 07:49)  T(F): 98.6 (2022 07:57), Max: 98.6 (2022 07:49)  HR: 67 (2022 08:49) (62 - 74)  BP: 98/55 (2022 07:57) (98/55 - 98/55)  BP(mean): --  RR: 18 (2022 07:57) (18 - 18)  SpO2: 100% (2022 08:49) (91% - 100%)    PE: NAD, AOx3, abdomen soft gravid  VE: 1.0/0/-3  EFM: 130, moderate variability, +accels, -decels  Ray: q2-3min

## 2022-11-30 NOTE — OB RN PATIENT PROFILE - NSICDXPASTSURGICALHX_GEN_ALL_CORE_FT
PAST SURGICAL HISTORY:  H/O breast augmentation 2000    One previous induced termination of pregnancy 4/2019

## 2022-12-01 LAB
GLUCOSE BLDC GLUCOMTR-MCNC: 117 MG/DL — HIGH (ref 70–99)
GLUCOSE BLDC GLUCOMTR-MCNC: 122 MG/DL — HIGH (ref 70–99)
GLUCOSE BLDC GLUCOMTR-MCNC: 90 MG/DL — SIGNIFICANT CHANGE UP (ref 70–99)

## 2022-12-01 PROCEDURE — 88307 TISSUE EXAM BY PATHOLOGIST: CPT | Mod: 26

## 2022-12-01 RX ORDER — SIMETHICONE 80 MG/1
80 TABLET, CHEWABLE ORAL EVERY 4 HOURS
Refills: 0 | Status: DISCONTINUED | OUTPATIENT
Start: 2022-12-01 | End: 2022-12-03

## 2022-12-01 RX ORDER — OXYTOCIN 10 UNIT/ML
41.67 VIAL (ML) INJECTION
Qty: 20 | Refills: 0 | Status: DISCONTINUED | OUTPATIENT
Start: 2022-12-01 | End: 2022-12-03

## 2022-12-01 RX ORDER — DIPHENHYDRAMINE HCL 50 MG
25 CAPSULE ORAL EVERY 6 HOURS
Refills: 0 | Status: DISCONTINUED | OUTPATIENT
Start: 2022-12-01 | End: 2022-12-03

## 2022-12-01 RX ORDER — PRAMOXINE HYDROCHLORIDE 150 MG/15G
1 AEROSOL, FOAM RECTAL EVERY 4 HOURS
Refills: 0 | Status: DISCONTINUED | OUTPATIENT
Start: 2022-12-01 | End: 2022-12-03

## 2022-12-01 RX ORDER — BENZOCAINE 10 %
1 GEL (GRAM) MUCOUS MEMBRANE EVERY 6 HOURS
Refills: 0 | Status: DISCONTINUED | OUTPATIENT
Start: 2022-12-01 | End: 2022-12-03

## 2022-12-01 RX ORDER — TETANUS TOXOID, REDUCED DIPHTHERIA TOXOID AND ACELLULAR PERTUSSIS VACCINE, ADSORBED 5; 2.5; 8; 8; 2.5 [IU]/.5ML; [IU]/.5ML; UG/.5ML; UG/.5ML; UG/.5ML
0.5 SUSPENSION INTRAMUSCULAR ONCE
Refills: 0 | Status: DISCONTINUED | OUTPATIENT
Start: 2022-12-01 | End: 2022-12-03

## 2022-12-01 RX ORDER — MAGNESIUM HYDROXIDE 400 MG/1
30 TABLET, CHEWABLE ORAL
Refills: 0 | Status: DISCONTINUED | OUTPATIENT
Start: 2022-12-01 | End: 2022-12-03

## 2022-12-01 RX ORDER — HYDROCORTISONE 1 %
1 OINTMENT (GRAM) TOPICAL EVERY 6 HOURS
Refills: 0 | Status: DISCONTINUED | OUTPATIENT
Start: 2022-12-01 | End: 2022-12-03

## 2022-12-01 RX ORDER — SODIUM CHLORIDE 9 MG/ML
1000 INJECTION INTRAMUSCULAR; INTRAVENOUS; SUBCUTANEOUS
Refills: 0 | Status: DISCONTINUED | OUTPATIENT
Start: 2022-12-01 | End: 2022-12-03

## 2022-12-01 RX ORDER — SODIUM CHLORIDE 9 MG/ML
3 INJECTION INTRAMUSCULAR; INTRAVENOUS; SUBCUTANEOUS EVERY 8 HOURS
Refills: 0 | Status: DISCONTINUED | OUTPATIENT
Start: 2022-12-01 | End: 2022-12-03

## 2022-12-01 RX ORDER — OXYCODONE HYDROCHLORIDE 5 MG/1
5 TABLET ORAL
Refills: 0 | Status: DISCONTINUED | OUTPATIENT
Start: 2022-12-01 | End: 2022-12-03

## 2022-12-01 RX ORDER — OXYCODONE HYDROCHLORIDE 5 MG/1
5 TABLET ORAL ONCE
Refills: 0 | Status: DISCONTINUED | OUTPATIENT
Start: 2022-12-01 | End: 2022-12-03

## 2022-12-01 RX ORDER — DIBUCAINE 1 %
1 OINTMENT (GRAM) RECTAL EVERY 6 HOURS
Refills: 0 | Status: DISCONTINUED | OUTPATIENT
Start: 2022-12-01 | End: 2022-12-03

## 2022-12-01 RX ORDER — SODIUM CHLORIDE 9 MG/ML
300 INJECTION INTRAMUSCULAR; INTRAVENOUS; SUBCUTANEOUS ONCE
Refills: 0 | Status: COMPLETED | OUTPATIENT
Start: 2022-12-01 | End: 2022-12-01

## 2022-12-01 RX ORDER — IBUPROFEN 200 MG
600 TABLET ORAL EVERY 6 HOURS
Refills: 0 | Status: DISCONTINUED | OUTPATIENT
Start: 2022-12-01 | End: 2022-12-03

## 2022-12-01 RX ORDER — ACETAMINOPHEN 500 MG
975 TABLET ORAL
Refills: 0 | Status: DISCONTINUED | OUTPATIENT
Start: 2022-12-01 | End: 2022-12-03

## 2022-12-01 RX ORDER — LANOLIN
1 OINTMENT (GRAM) TOPICAL EVERY 6 HOURS
Refills: 0 | Status: DISCONTINUED | OUTPATIENT
Start: 2022-12-01 | End: 2022-12-03

## 2022-12-01 RX ORDER — AER TRAVELER 0.5 G/1
1 SOLUTION RECTAL; TOPICAL EVERY 4 HOURS
Refills: 0 | Status: DISCONTINUED | OUTPATIENT
Start: 2022-12-01 | End: 2022-12-03

## 2022-12-01 RX ORDER — OXYTOCIN 10 UNIT/ML
4 VIAL (ML) INJECTION
Qty: 30 | Refills: 0 | Status: DISCONTINUED | OUTPATIENT
Start: 2022-12-01 | End: 2022-12-01

## 2022-12-01 RX ORDER — KETOROLAC TROMETHAMINE 30 MG/ML
30 SYRINGE (ML) INJECTION ONCE
Refills: 0 | Status: DISCONTINUED | OUTPATIENT
Start: 2022-12-01 | End: 2022-12-01

## 2022-12-01 RX ORDER — IBUPROFEN 200 MG
600 TABLET ORAL EVERY 6 HOURS
Refills: 0 | Status: COMPLETED | OUTPATIENT
Start: 2022-12-01 | End: 2023-10-30

## 2022-12-01 RX ADMIN — Medication 600 MILLIGRAM(S): at 17:48

## 2022-12-01 RX ADMIN — Medication 600 MILLIGRAM(S): at 15:36

## 2022-12-01 RX ADMIN — SODIUM CHLORIDE 3 MILLILITER(S): 9 INJECTION INTRAMUSCULAR; INTRAVENOUS; SUBCUTANEOUS at 15:37

## 2022-12-01 RX ADMIN — Medication 600 MILLIGRAM(S): at 18:21

## 2022-12-01 RX ADMIN — Medication 1000 MILLIUNIT(S)/MIN: at 06:26

## 2022-12-01 RX ADMIN — Medication 600 MILLIGRAM(S): at 13:06

## 2022-12-01 RX ADMIN — Medication 975 MILLIGRAM(S): at 20:56

## 2022-12-01 RX ADMIN — SODIUM CHLORIDE 3 MILLILITER(S): 9 INJECTION INTRAMUSCULAR; INTRAVENOUS; SUBCUTANEOUS at 23:43

## 2022-12-01 RX ADMIN — Medication 4 MILLIUNIT(S)/MIN: at 01:44

## 2022-12-01 RX ADMIN — SODIUM CHLORIDE 600 MILLILITER(S): 9 INJECTION INTRAMUSCULAR; INTRAVENOUS; SUBCUTANEOUS at 03:04

## 2022-12-01 RX ADMIN — Medication 108 GRAM(S): at 00:43

## 2022-12-01 RX ADMIN — Medication 975 MILLIGRAM(S): at 15:38

## 2022-12-01 RX ADMIN — Medication 30 MILLIGRAM(S): at 06:25

## 2022-12-01 RX ADMIN — Medication 975 MILLIGRAM(S): at 21:45

## 2022-12-01 RX ADMIN — Medication 975 MILLIGRAM(S): at 16:24

## 2022-12-01 RX ADMIN — SODIUM CHLORIDE 125 MILLILITER(S): 9 INJECTION INTRAMUSCULAR; INTRAVENOUS; SUBCUTANEOUS at 03:25

## 2022-12-01 RX ADMIN — Medication 1 TABLET(S): at 13:07

## 2022-12-01 NOTE — OB PROVIDER LABOR PROGRESS NOTE - NS_SUBJECTIVE/OBJECTIVE_OBGYN_ALL_OB_FT
Pt seen for cervical change per attending
comfortable with cei
Pt seen and examined at bedside for placement of IUPC and amnioinfusion for Cat 2 tracing. Pt c/o pressure, top off for epidural just done.      VE: 7/90/-2    Vital Signs Last 24 Hrs  T(C): 36.9 (01 Dec 2022 00:42), Max: 37.0 (30 Nov 2022 07:49)  T(F): 98.42 (01 Dec 2022 00:42), Max: 98.6 (30 Nov 2022 07:49)  HR: 71 (01 Dec 2022 03:04) (59 - 90)  BP: 100/58 (01 Dec 2022 02:50) (92/50 - 117/58)  RR: 18 (30 Nov 2022 21:20) (16 - 18)  SpO2: 95% (01 Dec 2022 03:04) (91% - 100%)
Pt seen for cervical exam per attending request; pt comfortable at this time
comfortable with cei

## 2022-12-01 NOTE — OB RN DELIVERY SUMMARY - NSSELHIDDEN_OBGYN_ALL_OB_FT
[NS_DeliveryAttending1_OBGYN_ALL_OB_FT:[id number]=],[NS_CirculateRN2_OBGYN_ALL_OB_FT:[id number]=]

## 2022-12-01 NOTE — OB PROVIDER DELIVERY SUMMARY - NSPROVIDERDELIVERYNOTE_OBGYN_ALL_OB_FT
of viable male infant over median episiotomy, shoulder and body delivered easily. Delayed cord clamping performed for a full minute under NICU guidance.   Cord clamped and cut and baby handed to awaiting peds.  Spontaneous delivery of intact placenta.   Fundal massage done, pitocin given. Firm fundus noted.   Perineum inspected and median episiotomy noted and repaired in usual fashion.   EBL 250cc  wants circ, consent obtained.   apgars 7/8, baby to nicu for prematurity  Kalpana Campbell MD

## 2022-12-01 NOTE — OB RN DELIVERY SUMMARY - NS_SEPSISRSKCALC_OBGYN_ALL_OB_FT
EOS calculated successfully. EOS Risk Factor: 0.5/1000 live births ([facility] national incidence); GA=34w5d; Temp=98.6; ROM=2.567; GBS='Unknown'; Antibiotics='GBS specific antibiotics > 2 hrs prior to birth'

## 2022-12-01 NOTE — OB NEONATOLOGY/PEDIATRICIAN DELIVERY SUMMARY - NSPEDSNEONOTESA_OBGYN_ALL_OB_FT
Called by OBGYN to attend [facility] due to prematurity. Baby is product of a 34.5 week gestation born to a  37 y.o. F. Maternal labs include Blood Type AB+, GBS unknown (adequately treated with ampicillin x4 prior to delivery), Hep B-, RPR-, Rubella imm, HIV-, Covid-. Maternal history is significant for TOP w/ D&C (conjoined twins). Pregnancy was complicated by GDMA2 (on insulin). Mother presented in  labor. She did not receive BMZ. AROM on 22 at 0207 with clear fluid, at approximately 2.5 hours. Delivery uncomplicated. Baby emerged crying and vigorous. Delayed cord clamping x60s performed. Upon arrival to warmer, baby w/d/s/s. CPAP 5/21-50% given for initial sats in 40s with quick improvement. Baby transferred to NICU on CPAP 5/21% for iWOB. Apgars were 7/8. EOS score: 0.24. Feeding: Breast and bottle. Consents to hep B. Consents to circumcision. Admit to NICU. Temperature prior to transfer: 36.6 C.

## 2022-12-02 LAB
HCT VFR BLD CALC: 28.4 % — LOW (ref 34.5–45)
HGB BLD-MCNC: 9.3 G/DL — LOW (ref 11.5–15.5)
MCHC RBC-ENTMCNC: 31.1 PG — SIGNIFICANT CHANGE UP (ref 27–34)
MCHC RBC-ENTMCNC: 32.7 GM/DL — SIGNIFICANT CHANGE UP (ref 32–36)
MCV RBC AUTO: 95 FL — SIGNIFICANT CHANGE UP (ref 80–100)
NRBC # BLD: 0 /100 WBCS — SIGNIFICANT CHANGE UP (ref 0–0)
PLATELET # BLD AUTO: 141 K/UL — LOW (ref 150–400)
RBC # BLD: 2.99 M/UL — LOW (ref 3.8–5.2)
RBC # FLD: 15.1 % — HIGH (ref 10.3–14.5)
WBC # BLD: 9.08 K/UL — SIGNIFICANT CHANGE UP (ref 3.8–10.5)
WBC # FLD AUTO: 9.08 K/UL — SIGNIFICANT CHANGE UP (ref 3.8–10.5)

## 2022-12-02 RX ORDER — SENNA PLUS 8.6 MG/1
1 TABLET ORAL DAILY
Refills: 0 | Status: DISCONTINUED | OUTPATIENT
Start: 2022-12-02 | End: 2022-12-03

## 2022-12-02 RX ORDER — FERROUS SULFATE 325(65) MG
325 TABLET ORAL
Refills: 0 | Status: DISCONTINUED | OUTPATIENT
Start: 2022-12-02 | End: 2022-12-03

## 2022-12-02 RX ORDER — ASCORBIC ACID 60 MG
500 TABLET,CHEWABLE ORAL DAILY
Refills: 0 | Status: DISCONTINUED | OUTPATIENT
Start: 2022-12-02 | End: 2022-12-03

## 2022-12-02 RX ADMIN — Medication 600 MILLIGRAM(S): at 06:42

## 2022-12-02 RX ADMIN — SENNA PLUS 1 TABLET(S): 8.6 TABLET ORAL at 15:01

## 2022-12-02 RX ADMIN — Medication 600 MILLIGRAM(S): at 05:56

## 2022-12-02 RX ADMIN — Medication 600 MILLIGRAM(S): at 00:03

## 2022-12-02 RX ADMIN — Medication 975 MILLIGRAM(S): at 02:33

## 2022-12-02 RX ADMIN — Medication 325 MILLIGRAM(S): at 17:52

## 2022-12-02 RX ADMIN — Medication 600 MILLIGRAM(S): at 23:37

## 2022-12-02 RX ADMIN — Medication 975 MILLIGRAM(S): at 09:29

## 2022-12-02 RX ADMIN — SODIUM CHLORIDE 3 MILLILITER(S): 9 INJECTION INTRAMUSCULAR; INTRAVENOUS; SUBCUTANEOUS at 14:27

## 2022-12-02 RX ADMIN — Medication 600 MILLIGRAM(S): at 12:43

## 2022-12-02 RX ADMIN — Medication 1 TABLET(S): at 11:43

## 2022-12-02 RX ADMIN — Medication 975 MILLIGRAM(S): at 16:01

## 2022-12-02 RX ADMIN — Medication 975 MILLIGRAM(S): at 15:01

## 2022-12-02 RX ADMIN — Medication 600 MILLIGRAM(S): at 17:52

## 2022-12-02 RX ADMIN — Medication 975 MILLIGRAM(S): at 20:50

## 2022-12-02 RX ADMIN — Medication 600 MILLIGRAM(S): at 11:43

## 2022-12-02 RX ADMIN — Medication 975 MILLIGRAM(S): at 21:20

## 2022-12-02 RX ADMIN — Medication 600 MILLIGRAM(S): at 18:52

## 2022-12-02 RX ADMIN — Medication 975 MILLIGRAM(S): at 08:29

## 2022-12-02 RX ADMIN — Medication 600 MILLIGRAM(S): at 04:05

## 2022-12-02 RX ADMIN — Medication 975 MILLIGRAM(S): at 03:15

## 2022-12-02 RX ADMIN — Medication 500 MILLIGRAM(S): at 15:01

## 2022-12-02 NOTE — CHART NOTE - NSCHARTNOTEFT_GEN_A_CORE
PA Anemia NOTE     Day 1       Vital Signs Last 24 Hrs  T(C): 36.7 (02 Dec 2022 05:43), Max: 37 (01 Dec 2022 17:00)  T(F): 98 (02 Dec 2022 05:43), Max: 98.6 (01 Dec 2022 17:00)  HR: 65 (02 Dec 2022 05:43) (65 - 88)  BP: 97/62 (02 Dec 2022 05:43) (96/63 - 110/71)  RR: 18 (02 Dec 2022 05:43) (18 - 18)  SpO2: 97% (02 Dec 2022 05:43) (95% - 97%)    Parameters below as of 02 Dec 2022 05:43  Patient On (Oxygen Delivery Method): room air                 9.3    9.08  )-----------( 141      ( 12-02 @ 08:36 )             28.4                11.1   8.90  )-----------( 158      ( 11-30 @ 10:35 )             34.4       Assessment:  37   y.o. S/P  with anemia  due to acute blood loss-VSS/Asx-not requiring blood tranfusion for Iron Supplementation  Plan:  - Ferrous Sulfate, Colace, Vitamin C supplementation.  - Monitor for signs/symptoms of anemia.     DANILO Wakefield

## 2022-12-02 NOTE — CHART NOTE - NSCHARTNOTEFT_GEN_A_CORE
Patient seen for: nutrition consult for GDM postpartum education prior to discharge    Source: patient, electronic medical record   pt speaks Sami,  used [id number]  Patient is: ; GDM [A2]    Chart reviewed, events noted. Meds/Labs reviewed.    Anthropometrics:  Height: 5'7" inches  Pre-pregnancy weight: 114 pounds   Weight gain: 35 pounds   Admit/Dosing wt: 149.9 pounds     Nutrition Diagnosis:   Food and Nutrition Related Knowledge Deficit related to limited previous exposure to postpartum GDM nutrition education and recommendations as evidenced by GDM postpartum.    Goal: Pt to state at least two teach back points.    Intervention:  1. Education: Pt educated on postpartum dietary recommendations, including risk of development of T2DM; reinforced importance of DM screening 4-12 weeks postpartum. Reviewed nutrition recommendations for breast feeding, including adequate protein, calorie, and fluid intake.   2. Handout: "What to expect now that you have had your baby"     Monitoring:  No other nutrition risks were identified during this encounter.  RD remains available upon request and will follow up per protocol.  Page Marinelli RD CDN #[phone]

## 2022-12-03 ENCOUNTER — TRANSCRIPTION ENCOUNTER (OUTPATIENT)
Age: 37
End: 2022-12-03

## 2022-12-03 VITALS
OXYGEN SATURATION: 96 % | HEART RATE: 65 BPM | DIASTOLIC BLOOD PRESSURE: 65 MMHG | SYSTOLIC BLOOD PRESSURE: 101 MMHG | RESPIRATION RATE: 18 BRPM | TEMPERATURE: 98 F

## 2022-12-03 PROCEDURE — 59050 FETAL MONITOR W/REPORT: CPT

## 2022-12-03 PROCEDURE — 81001 URINALYSIS AUTO W/SCOPE: CPT

## 2022-12-03 PROCEDURE — 85027 COMPLETE CBC AUTOMATED: CPT

## 2022-12-03 PROCEDURE — 87635 SARS-COV-2 COVID-19 AMP PRB: CPT

## 2022-12-03 PROCEDURE — 59025 FETAL NON-STRESS TEST: CPT

## 2022-12-03 PROCEDURE — 88307 TISSUE EXAM BY PATHOLOGIST: CPT

## 2022-12-03 PROCEDURE — 86900 BLOOD TYPING SEROLOGIC ABO: CPT

## 2022-12-03 PROCEDURE — 86769 SARS-COV-2 COVID-19 ANTIBODY: CPT

## 2022-12-03 PROCEDURE — 86901 BLOOD TYPING SEROLOGIC RH(D): CPT

## 2022-12-03 PROCEDURE — 86780 TREPONEMA PALLIDUM: CPT

## 2022-12-03 PROCEDURE — G0463: CPT

## 2022-12-03 PROCEDURE — 36415 COLL VENOUS BLD VENIPUNCTURE: CPT

## 2022-12-03 PROCEDURE — 82962 GLUCOSE BLOOD TEST: CPT

## 2022-12-03 PROCEDURE — 85025 COMPLETE CBC W/AUTO DIFF WBC: CPT

## 2022-12-03 PROCEDURE — 86850 RBC ANTIBODY SCREEN: CPT

## 2022-12-03 RX ADMIN — Medication 600 MILLIGRAM(S): at 00:22

## 2022-12-03 RX ADMIN — Medication 975 MILLIGRAM(S): at 03:14

## 2022-12-03 RX ADMIN — Medication 975 MILLIGRAM(S): at 02:34

## 2022-12-03 RX ADMIN — Medication 325 MILLIGRAM(S): at 05:40

## 2022-12-03 RX ADMIN — Medication 600 MILLIGRAM(S): at 06:17

## 2022-12-03 RX ADMIN — Medication 600 MILLIGRAM(S): at 05:40

## 2022-12-03 NOTE — DISCHARGE NOTE OB - CARE PROVIDER_API CALL
Yolanda Tejada)  Obstetrics and Gynecology  [address] [facility], [address] [place] [address]  Phone: [phone]  Fax: [phone]  Follow Up Time:

## 2022-12-03 NOTE — DISCHARGE NOTE OB - NS MD DC FALL RISK RISK
For information on Fall & Injury Prevention, visit: [email or web address] OR  [email or web address] OR  https://www.cdc.gov/steadi/patient.html

## 2022-12-03 NOTE — PROGRESS NOTE ADULT - ASSESSMENT
Assessment:   36yo now postpartum day 1 from a , recovering well.     Plan:   #Postpartum recovery from vaginal delivery  - encourage PO hydration   - AB CBC  - Continue scheduled Ibuprofen and Acetaminophen for pain, Oxycodone available PRN for breakthrough pain.  - Increase ambulation, SCDs when not ambulating  - Continue regular diet        Ann Samaniego PGY1    
36y/o  PPD#1 from  c/b GDMA2 in stable condition. Patient is progressing well postpartum.    #GDMA2  - f/u outpatient postpartum OGTT    #postpartum state  - Continue with po analgesia  - Increase ambulation  - Continue regular diet  - IV lock  - No labs    Rakesh Reyes  PGY-1

## 2022-12-03 NOTE — PROGRESS NOTE ADULT - SUBJECTIVE AND OBJECTIVE BOX
R1 Progress Note    Patient seen and examined at bedside, no acute overnight events. No acute complaints, pain well controlled. Patient is ambulating, voiding spontaneously, passing gas, and tolerating regular diet. Denies CP, SOB, N/V, HA, blurred vision, epigastric pain.    Vital Signs Last 24 Hours  T(C): 36.8 (12-03-22 @ 05:44), Max: 36.8 (12-02-22 @ 17:12)  HR: 65 (12-03-22 @ 05:44) (65 - 79)  BP: 101/65 (12-03-22 @ 05:44) (101/65 - 118/80)  RR: 18 (12-03-22 @ 05:44) (18 - 19)  SpO2: 96% (12-03-22 @ 05:44) (96% - 96%)    Physical Exam:  General: NAD  Abdomen: Soft, non-tender, non-distended, fundus firm  Pelvic: Lochia wnl    Labs:    Blood Type: AB Positive  Antibody Screen: Negative  RPR: Negative               9.3    9.08  )-----------( 141      ( 12-02 @ 08:36 )             28.4                11.1   8.90  )-----------( 158      ( 11-30 @ 10:35 )             34.4         MEDICATIONS  (STANDING):  acetaminophen     Tablet .. 975 milliGRAM(s) Oral <User Schedule>  ascorbic acid 500 milliGRAM(s) Oral daily  diphtheria/tetanus/pertussis (acellular) Vaccine (Adacel) 0.5 milliLiter(s) IntraMuscular once  ferrous    sulfate 325 milliGRAM(s) Oral two times a day  ibuprofen  Tablet. 600 milliGRAM(s) Oral every 6 hours  oxytocin Infusion 41.667 milliUNIT(s)/Min (125 mL/Hr) IV Continuous <Continuous>  prenatal multivitamin 1 Tablet(s) Oral daily  senna 1 Tablet(s) Oral daily  sodium chloride 0.9% lock flush 3 milliLiter(s) IV Push every 8 hours  sodium chloride 0.9%. 1000 milliLiter(s) (125 mL/Hr) IV Continuous <Continuous>  sodium chloride 0.9%. 1000 milliLiter(s) (125 mL/Hr) IV Continuous <Continuous>    MEDICATIONS  (PRN):  benzocaine 20%/menthol 0.5% Spray 1 Spray(s) Topical every 6 hours PRN for Perineal discomfort  dibucaine 1% Ointment 1 Application(s) Topical every 6 hours PRN Perineal discomfort  diphenhydrAMINE 25 milliGRAM(s) Oral every 6 hours PRN Pruritus  hydrocortisone 1% Cream 1 Application(s) Topical every 6 hours PRN Moderate Pain (4-6)  lanolin Ointment 1 Application(s) Topical every 6 hours PRN nipple soreness  magnesium hydroxide Suspension 30 milliLiter(s) Oral two times a day PRN Constipation  oxyCODONE    IR 5 milliGRAM(s) Oral every 3 hours PRN Moderate to Severe Pain (4-10)  oxyCODONE    IR 5 milliGRAM(s) Oral once PRN Moderate to Severe Pain (4-10)  pramoxine 1%/zinc 5% Cream 1 Application(s) Topical every 4 hours PRN Moderate Pain (4-6)  simethicone 80 milliGRAM(s) Chew every 4 hours PRN Gas  witch hazel Pads 1 Application(s) Topical every 4 hours PRN Perineal discomfort  
OB Postpartum Progress Note: PPD #1     36yo now PPD #1 after  seen and examined at bedside, no acute overnight events. Patient complains of feeling dizzy this morning. Her pain is well controlled. States she is ambulating, voiding spontaneously, passing gas, and tolerating regular diet. Denies CP, SOB, N/V, HA, blurred vision, epigastric pain.    Vital Signs Last 24 Hours  T(C): 36.7 (22 @ 05:43), Max: 37.3 (22 @ 08:40)  HR: 65 (22 @ 05:43) (64 - 88)  BP: 97/62 (22 @ 05:43) (95/63 - 115/75)  RR: 18 (22 @ 05:43) (16 - 18)  SpO2: 97% (22 @ 05:43) (95% - 99%)    Physical Exam:  General: NAD, resting comfortably in bed   Abdomen: Soft, non-tender, non-distended, fundus firm  Extremities: Full ROM, moving all extremities spontaneously  Pelvic: Lochia wnl    Labs:    Blood Type: AB Positive  Antibody Screen: Negative  RPR: Negative               11.1   8.90  )-----------( 158      ( 11-30 @ 10:35 )             34.4         MEDICATIONS  (STANDING):  acetaminophen     Tablet .. 975 milliGRAM(s) Oral <User Schedule>  diphtheria/tetanus/pertussis (acellular) Vaccine (Adacel) 0.5 milliLiter(s) IntraMuscular once  ibuprofen  Tablet. 600 milliGRAM(s) Oral every 6 hours  oxytocin Infusion 41.667 milliUNIT(s)/Min (125 mL/Hr) IV Continuous <Continuous>  prenatal multivitamin 1 Tablet(s) Oral daily  sodium chloride 0.9% lock flush 3 milliLiter(s) IV Push every 8 hours  sodium chloride 0.9%. 1000 milliLiter(s) (125 mL/Hr) IV Continuous <Continuous>  sodium chloride 0.9%. 1000 milliLiter(s) (125 mL/Hr) IV Continuous <Continuous>    MEDICATIONS  (PRN):  benzocaine 20%/menthol 0.5% Spray 1 Spray(s) Topical every 6 hours PRN for Perineal discomfort  dibucaine 1% Ointment 1 Application(s) Topical every 6 hours PRN Perineal discomfort  diphenhydrAMINE 25 milliGRAM(s) Oral every 6 hours PRN Pruritus  hydrocortisone 1% Cream 1 Application(s) Topical every 6 hours PRN Moderate Pain (4-6)  lanolin Ointment 1 Application(s) Topical every 6 hours PRN nipple soreness  magnesium hydroxide Suspension 30 milliLiter(s) Oral two times a day PRN Constipation  oxyCODONE    IR 5 milliGRAM(s) Oral every 3 hours PRN Moderate to Severe Pain (4-10)  oxyCODONE    IR 5 milliGRAM(s) Oral once PRN Moderate to Severe Pain (4-10)  pramoxine 1%/zinc 5% Cream 1 Application(s) Topical every 4 hours PRN Moderate Pain (4-6)  simethicone 80 milliGRAM(s) Chew every 4 hours PRN Gas  witch hazel Pads 1 Application(s) Topical every 4 hours PRN Perineal discomfort

## 2022-12-03 NOTE — DISCHARGE NOTE OB - PATIENT PORTAL LINK FT
You can access the FollowMyHealth Patient Portal offered by [facility] by registering at the following website: [email or web address]. By joining [employer]’s FollowMyHealth portal, you will also be able to view your health information using other applications (apps) compatible with our system.

## 2022-12-03 NOTE — PROGRESS NOTE ADULT - NS ATTEND AMEND GEN_ALL_CORE FT
anai nsd   delivery at 34 weeks  baby in nicu  reviewed perineal care  nothing per vagina  fu in office 6 weeks  for discharge amado james
pt sp nsd 34+ weeks  no ppd1  baby in nicu  routine post partum care

## 2022-12-03 NOTE — DISCHARGE NOTE OB - HOSPITAL COURSE
patient presented  in labor. delivered vaginally. baby to NICU. discharged home ppd2. see full chart for details.

## 2022-12-03 NOTE — DISCHARGE NOTE OB - CARE PLAN
1 Principal Discharge DX:	Vaginal delivery  Assessment and plan of treatment:	routine post partum care  sp delivery of  infant  doing well  routine pp care  nothing per vagina x 6 weeks

## 2022-12-03 NOTE — DISCHARGE NOTE OB - PLAN OF CARE
routine post partum care  sp delivery of  infant  doing well  routine pp care  nothing per vagina x 6 weeks

## 2022-12-16 LAB — SURGICAL PATHOLOGY STUDY: SIGNIFICANT CHANGE UP

## 2023-01-18 NOTE — OB RN PATIENT PROFILE - PATIENT REPRESENTATIVE NAME
Plan: -\\n- Reassured that lesions are healing, recommended to continue with topical as needed. PVU. Continue Regimen: -\\n- imiquimod 5 % topical cream packet : Apply to spots QD PRN Render In Strict Bullet Format?: No Detail Level: Zone Ray Villarreal

## 2023-07-18 NOTE — PATIENT PROFILE OB - LIVING CHILDREN, OB PROFILE
0 Oxybutynin Counseling:  I discussed with the patient the risks of oxybutynin including but not limited to skin rash, drowsiness, dry mouth, difficulty urinating, and blurred vision.

## 2023-09-05 NOTE — OB RN PATIENT PROFILE - ALERT: PERTINENT HISTORY
1st Trimester Sonogram/20 Week Level II Sonogram/BioPhysical Profile(s)/Fetal Non-Stress Test (NST)/Ultra Screen at 12 Weeks Scc Well Differentiated Histology Text: There were aggregates of well-differentiated squamous cells.

## 2024-05-10 NOTE — H&P PST ADULT - LAST ECHOCARDIOGRAM
Medications  Medications reviewed with patient/caregiver?: Yes (5/10/2024 11:05 AM)  Is the patient having any side effects they believe may be caused by any medication additions or changes?: No (5/10/2024 11:05 AM)  Does the patient have all medications ordered at discharge?: Yes (5/10/2024 11:05 AM)  Care Management Interventions: No intervention needed (5/10/2024 11:05 AM)  Is the patient taking all medications as directed (includes completed medication regime)?: Yes (5/10/2024 11:05 AM)  Care Management Interventions: Provided patient education (5/10/2024 11:05 AM)    Appointments  Does the patient have a primary care provider?: Yes (following up with OB/GYN) (5/10/2024 11:05 AM)  Care Management Interventions: Verified appointment date/time/provider (5/10/2024 11:05 AM)  Has the patient kept scheduled appointments due by today?: Not applicable (5/10/2024 11:05 AM)    Patient Teaching  Does the patient have access to their discharge instructions?: Yes (5/10/2024 11:05 AM)  Care Management Interventions: Reviewed instructions with patient (5/10/2024 11:05 AM)  What is the patient's perception of their health status since discharge?: Improving (5/10/2024 11:05 AM)    Wrap Up  Wrap Up Additional Comments: Cecelia returned call.  Doing fair, has quite a bit of paian, is taking her oxy as prescribed.  support is fair, mother went back to work today, gransmother is there but she has dementia.  Cecelia does have a sx appt scheduled next week; she will contact the office if pain becomes too much.  Taking fluids well, sticking to a liquid to soft diet right now as she doesn't have much of an appetite.  She is splinting upon movement, aware that she does need to ambulate some.  Offered an [facility] dietitian; she already has a dietitian through the bariatric program.  Aware that a chat bot has been assigned (5/10/2024 11:05 AM)         no